# Patient Record
Sex: MALE | Race: WHITE | NOT HISPANIC OR LATINO
[De-identification: names, ages, dates, MRNs, and addresses within clinical notes are randomized per-mention and may not be internally consistent; named-entity substitution may affect disease eponyms.]

---
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
## 2024-05-17 NOTE — ED PROVIDER NOTE - IV ALTEPLASE INCLUSION HIDDEN
Problem: Adult Inpatient Plan of Care  Goal: Plan of Care Review  Outcome: Progressing  Goal: Patient-Specific Goal (Individualized)  Outcome: Progressing  Goal: Absence of Hospital-Acquired Illness or Injury  Outcome: Progressing  Goal: Optimal Comfort and Wellbeing  Outcome: Progressing  Goal: Readiness for Transition of Care  Outcome: Progressing      show

## 2024-05-24 ENCOUNTER — APPOINTMENT (OUTPATIENT)
Dept: ORTHOPEDIC SURGERY | Facility: CLINIC | Age: 19
End: 2024-05-24
Payer: COMMERCIAL

## 2024-05-24 VITALS — BODY MASS INDEX: 25.06 KG/M2 | HEIGHT: 72 IN | WEIGHT: 185 LBS

## 2024-05-24 DIAGNOSIS — S90.32XA CONTUSION OF LEFT FOOT, INITIAL ENCOUNTER: ICD-10-CM

## 2024-05-24 PROCEDURE — L4361: CPT | Mod: LT

## 2024-05-24 PROCEDURE — 73630 X-RAY EXAM OF FOOT: CPT | Mod: LT

## 2024-05-24 PROCEDURE — 99203 OFFICE O/P NEW LOW 30 MIN: CPT | Mod: 25

## 2024-05-24 NOTE — DISCUSSION/SUMMARY
[de-identified] : I recommend rest, ice, compression, elevation.  He was placed in an Ace bandage today and a short cam walker boot.  He was advised he can weight-bear as tolerated, but I recommended he use crutches as long as he is having discomfort while bearing weight.  He was also advised to continue with over-the-counter anti-inflammatories as needed for pain and swelling.  He will be leaving for [place] next week, and we discussed appropriate precautions while he is away on his trip.  He will follow-up in approximately 1 month's time for repeat assessment.  The patient was advised that if any issues arise prior to his appointment, that he should contact the office prior to his follow-up.  The patient was amenable to this plan.  All of his questions were answered to his satisfaction.

## 2024-05-24 NOTE — PHYSICAL EXAM
[de-identified] : On examination of his left foot, he is mildly edematous.  Not ecchymotic.  Skin is intact.  He has some tenderness noted along the base of the fifth metatarsal.  Mild tenderness as well as along the cuboid.  No other tenderness about the midfoot or remainder of the metatarsals.  He has no tenderness along the talus, or along the ankle.  He has full range of motion of the ankle without any limitations.  No pain along the heel.  No pain with calcaneal compression.  He can wiggle his toes.  Mild discomfort laterally with metatarsal compression.  Neurovascular intact distally.

## 2024-05-24 NOTE — DATA REVIEWED
[FreeTextEntry1] : 3 x-ray views were obtained today of the patient's left foot.  I see no obvious evidence of acute fracture or dislocation.

## 2024-05-24 NOTE — HISTORY OF PRESENT ILLNESS
[de-identified] : The patient presents today with his mother for evaluation of his left foot.  He states that 3 days ago, he was jumping into a pool, and landed on the lateral side of his foot.  Since then has had pain and swelling in it.  He has been able to bear weight with discomfort.  He denies any previous problems in the foot.  He took Advil once last night with minimal relief.

## 2024-06-27 ENCOUNTER — APPOINTMENT (OUTPATIENT)
Dept: ORTHOPEDIC SURGERY | Facility: CLINIC | Age: 19
End: 2024-06-27

## 2025-03-28 ENCOUNTER — APPOINTMENT (OUTPATIENT)
Dept: ORTHOPEDIC SURGERY | Facility: CLINIC | Age: 20
End: 2025-03-28
Payer: COMMERCIAL

## 2025-03-28 VITALS — WEIGHT: 195 LBS | HEIGHT: 72 IN | BODY MASS INDEX: 26.41 KG/M2

## 2025-03-28 DIAGNOSIS — S62.326A DISPLACED FRACTURE OF SHAFT OF FIFTH METACARPAL BONE, RIGHT HAND, INITIAL ENCOUNTER FOR CLOSED FRACTURE: ICD-10-CM

## 2025-03-28 DIAGNOSIS — S69.91XA UNSPECIFIED INJURY OF RIGHT WRIST, HAND AND FINGER(S), INITIAL ENCOUNTER: ICD-10-CM

## 2025-03-28 PROCEDURE — 99213 OFFICE O/P EST LOW 20 MIN: CPT | Mod: 25

## 2025-03-28 PROCEDURE — 73130 X-RAY EXAM OF HAND: CPT | Mod: RT

## 2025-04-17 ENCOUNTER — APPOINTMENT (OUTPATIENT)
Dept: ORTHOPEDIC SURGERY | Facility: CLINIC | Age: 20
End: 2025-04-17
Payer: COMMERCIAL

## 2025-04-17 DIAGNOSIS — S62.326A DISPLACED FRACTURE OF SHAFT OF FIFTH METACARPAL BONE, RIGHT HAND, INITIAL ENCOUNTER FOR CLOSED FRACTURE: ICD-10-CM

## 2025-04-17 PROCEDURE — 73130 X-RAY EXAM OF HAND: CPT | Mod: RT

## 2025-04-17 PROCEDURE — 99213 OFFICE O/P EST LOW 20 MIN: CPT

## 2025-04-29 ENCOUNTER — INPATIENT (INPATIENT)
Facility: HOSPITAL | Age: 20
LOS: 4 days | Discharge: HOME | DRG: 558 | End: 2025-05-04
Attending: STUDENT IN AN ORGANIZED HEALTH CARE EDUCATION/TRAINING PROGRAM | Admitting: FAMILY MEDICINE
Payer: COMMERCIAL

## 2025-04-29 VITALS
RESPIRATION RATE: 20 BRPM | HEART RATE: 99 BPM | TEMPERATURE: 98 F | SYSTOLIC BLOOD PRESSURE: 133 MMHG | HEIGHT: 72 IN | OXYGEN SATURATION: 97 % | WEIGHT: 199.96 LBS | DIASTOLIC BLOOD PRESSURE: 96 MMHG

## 2025-04-29 LAB
ALBUMIN SERPL ELPH-MCNC: 4.7 G/DL — SIGNIFICANT CHANGE UP (ref 3.5–5.2)
ALP SERPL-CCNC: 121 U/L — HIGH (ref 30–115)
ALT FLD-CCNC: 68 U/L — HIGH (ref 13–38)
ANION GAP SERPL CALC-SCNC: 12 MMOL/L — SIGNIFICANT CHANGE UP (ref 7–14)
APPEARANCE UR: CLEAR — SIGNIFICANT CHANGE UP
AST SERPL-CCNC: 240 U/L — HIGH (ref 13–38)
BASOPHILS # BLD AUTO: 0.05 K/UL — SIGNIFICANT CHANGE UP (ref 0–0.2)
BASOPHILS NFR BLD AUTO: 0.4 % — SIGNIFICANT CHANGE UP (ref 0–1)
BILIRUB SERPL-MCNC: 0.5 MG/DL — SIGNIFICANT CHANGE UP (ref 0.2–1.2)
BILIRUB UR-MCNC: NEGATIVE — SIGNIFICANT CHANGE UP
BUN SERPL-MCNC: 12 MG/DL — SIGNIFICANT CHANGE UP (ref 10–20)
CALCIUM SERPL-MCNC: 9.3 MG/DL — SIGNIFICANT CHANGE UP (ref 8.4–10.5)
CHLORIDE SERPL-SCNC: 104 MMOL/L — SIGNIFICANT CHANGE UP (ref 98–110)
CK SERPL-CCNC: HIGH U/L (ref 0–225)
CO2 SERPL-SCNC: 25 MMOL/L — SIGNIFICANT CHANGE UP (ref 17–32)
COLOR SPEC: YELLOW — SIGNIFICANT CHANGE UP
CREAT SERPL-MCNC: 0.9 MG/DL — SIGNIFICANT CHANGE UP (ref 0.7–1.5)
DIFF PNL FLD: NEGATIVE — SIGNIFICANT CHANGE UP
EGFR: 125 ML/MIN/1.73M2 — SIGNIFICANT CHANGE UP
EGFR: 125 ML/MIN/1.73M2 — SIGNIFICANT CHANGE UP
EOSINOPHIL # BLD AUTO: 0.07 K/UL — SIGNIFICANT CHANGE UP (ref 0–0.7)
EOSINOPHIL NFR BLD AUTO: 0.5 % — SIGNIFICANT CHANGE UP (ref 0–8)
GLUCOSE SERPL-MCNC: 100 MG/DL — HIGH (ref 70–99)
GLUCOSE UR QL: NEGATIVE MG/DL — SIGNIFICANT CHANGE UP
HCT VFR BLD CALC: 45.8 % — SIGNIFICANT CHANGE UP (ref 42–52)
HGB BLD-MCNC: 15.7 G/DL — SIGNIFICANT CHANGE UP (ref 14–18)
IMM GRANULOCYTES NFR BLD AUTO: 0.3 % — SIGNIFICANT CHANGE UP (ref 0.1–0.3)
KETONES UR-MCNC: NEGATIVE MG/DL — SIGNIFICANT CHANGE UP
LEUKOCYTE ESTERASE UR-ACNC: NEGATIVE — SIGNIFICANT CHANGE UP
LYMPHOCYTES # BLD AUTO: 1.86 K/UL — SIGNIFICANT CHANGE UP (ref 1.2–3.4)
LYMPHOCYTES # BLD AUTO: 14.2 % — LOW (ref 20.5–51.1)
MCHC RBC-ENTMCNC: 29.6 PG — SIGNIFICANT CHANGE UP (ref 27–31)
MCHC RBC-ENTMCNC: 34.3 G/DL — SIGNIFICANT CHANGE UP (ref 32–37)
MCV RBC AUTO: 86.3 FL — SIGNIFICANT CHANGE UP (ref 80–94)
MONOCYTES # BLD AUTO: 0.96 K/UL — HIGH (ref 0.1–0.6)
MONOCYTES NFR BLD AUTO: 7.3 % — SIGNIFICANT CHANGE UP (ref 1.7–9.3)
NEUTROPHILS # BLD AUTO: 10.12 K/UL — HIGH (ref 1.4–6.5)
NEUTROPHILS NFR BLD AUTO: 77.3 % — HIGH (ref 42.2–75.2)
NITRITE UR-MCNC: NEGATIVE — SIGNIFICANT CHANGE UP
NRBC BLD AUTO-RTO: 0 /100 WBCS — SIGNIFICANT CHANGE UP (ref 0–0)
PH UR: 6.5 — SIGNIFICANT CHANGE UP (ref 5–8)
PLATELET # BLD AUTO: 292 K/UL — SIGNIFICANT CHANGE UP (ref 130–400)
PMV BLD: 8.6 FL — SIGNIFICANT CHANGE UP (ref 7.4–10.4)
POTASSIUM SERPL-MCNC: 4 MMOL/L — SIGNIFICANT CHANGE UP (ref 3.5–5)
POTASSIUM SERPL-SCNC: 4 MMOL/L — SIGNIFICANT CHANGE UP (ref 3.5–5)
PROT SERPL-MCNC: 7.4 G/DL — SIGNIFICANT CHANGE UP (ref 6–8)
PROT UR-MCNC: NEGATIVE MG/DL — SIGNIFICANT CHANGE UP
RBC # BLD: 5.31 M/UL — SIGNIFICANT CHANGE UP (ref 4.7–6.1)
RBC # FLD: 13.3 % — SIGNIFICANT CHANGE UP (ref 11.5–14.5)
SODIUM SERPL-SCNC: 141 MMOL/L — SIGNIFICANT CHANGE UP (ref 135–146)
SP GR SPEC: 1.02 — SIGNIFICANT CHANGE UP (ref 1–1.03)
UROBILINOGEN FLD QL: 1 MG/DL — SIGNIFICANT CHANGE UP (ref 0.2–1)
WBC # BLD: 13.1 K/UL — HIGH (ref 4.8–10.8)
WBC # FLD AUTO: 13.1 K/UL — HIGH (ref 4.8–10.8)

## 2025-04-29 PROCEDURE — 82977 ASSAY OF GGT: CPT

## 2025-04-29 PROCEDURE — 82550 ASSAY OF CK (CPK): CPT

## 2025-04-29 PROCEDURE — 93970 EXTREMITY STUDY: CPT

## 2025-04-29 PROCEDURE — 80048 BASIC METABOLIC PNL TOTAL CA: CPT

## 2025-04-29 PROCEDURE — 85027 COMPLETE CBC AUTOMATED: CPT

## 2025-04-29 PROCEDURE — 36415 COLL VENOUS BLD VENIPUNCTURE: CPT

## 2025-04-29 PROCEDURE — 80053 COMPREHEN METABOLIC PANEL: CPT

## 2025-04-29 PROCEDURE — 99222 1ST HOSP IP/OBS MODERATE 55: CPT

## 2025-04-29 PROCEDURE — 99285 EMERGENCY DEPT VISIT HI MDM: CPT

## 2025-04-29 RX ORDER — KETOROLAC TROMETHAMINE 30 MG/ML
15 INJECTION, SOLUTION INTRAMUSCULAR; INTRAVENOUS ONCE
Refills: 0 | Status: DISCONTINUED | OUTPATIENT
Start: 2025-04-29 | End: 2025-04-29

## 2025-04-29 RX ADMIN — Medication 1000 MILLILITER(S): at 22:42

## 2025-04-29 RX ADMIN — Medication 1000 MILLILITER(S): at 23:12

## 2025-04-29 NOTE — ED PROVIDER NOTE - OBJECTIVE STATEMENT
18 y/o male presents to the with b/l upper extremity pain worse with movement 2 days after vigorous weight lifting at gym. patient with decreased ability to extend b/l elbows secondary to pain. no relief with ibuprofen. no distal tingling. patient denies any rash, abdominal pain or vomiting. patient with good urinary output however describes as dark. no fevers.

## 2025-04-29 NOTE — ED PROVIDER NOTE - CARE PLAN
1 Principal Discharge DX:	Rhabdomyolysis   Principal Discharge DX:	Rhabdomyolysis  Secondary Diagnosis:	Elevated liver function tests

## 2025-04-29 NOTE — ED PROVIDER NOTE - PHYSICAL EXAMINATION
general: well appearing, no distress  eyes: clear conjunctiva  ent: oropharynx clear   cardiac: no murmurs, extrasystole, regular rate, regular rhythm  resp: clear throughout all lung fields, no respiratory distress  abdomen: no CVA tenderness, BS x 4, non tender, no distention, no rashes, no rebound or guarding  msk: +ttp along volar aspect of forearm and distal humerus with decreased extension at elbows L>R  skin: no rashes, swelling, bruising general: well appearing, no distress  eyes: clear conjunctiva  ent: oropharynx clear   cardiac: no murmurs, extrasystole, regular rate, regular rhythm  resp: clear throughout all lung fields, no respiratory distress  abdomen: no CVA tenderness, BS x 4, non tender, no distention, no rashes, no rebound or guarding  msk: +ttp along volar aspect of forearm and distal humerus with decreased extension at elbows L>R  skin: no rashes, swelling, bruising, compartments soft

## 2025-04-29 NOTE — ED PROVIDER NOTE - ATTENDING APP SHARED VISIT CONTRIBUTION OF CARE
19-year-old male no significant past medical history presents with his mom for evaluation of bilateral arm pain left more than right for the last 2 days.  Patient recently returned to the gym and did bicep curls with heavy weights.  Patient states he is having trouble specially with the left arm.  No direct trauma, no shortness of breath, no difficulty urinating, patient states that it is dark at times.  On exam patient in NAD, AAO x 3, OP clear, MMM, abdomen soft nontender, patient with tenderness to left forearm with painful range of motion, no swelling, distal pulses, no skin changes

## 2025-04-29 NOTE — ED PROVIDER NOTE - CLINICAL SUMMARY MEDICAL DECISION MAKING FREE TEXT BOX
IV fluids were initiated and pain was treated.  We obtained labs.  Patient has elevated CK level.  Renal function is normal.  Patient does have elevation in LFTs.  Results discussed with him and his mom.  Patient will require admission for these findings at this time.

## 2025-04-30 LAB
ALBUMIN SERPL ELPH-MCNC: 3.9 G/DL — SIGNIFICANT CHANGE UP (ref 3.5–5.2)
ALP SERPL-CCNC: 99 U/L — SIGNIFICANT CHANGE UP (ref 30–115)
ALT FLD-CCNC: 68 U/L — HIGH (ref 13–38)
ANION GAP SERPL CALC-SCNC: 15 MMOL/L — HIGH (ref 7–14)
AST SERPL-CCNC: 245 U/L — HIGH (ref 13–38)
BILIRUB SERPL-MCNC: 0.9 MG/DL — SIGNIFICANT CHANGE UP (ref 0.2–1.2)
BUN SERPL-MCNC: 7 MG/DL — LOW (ref 10–20)
CALCIUM SERPL-MCNC: 8.7 MG/DL — SIGNIFICANT CHANGE UP (ref 8.4–10.5)
CHLORIDE SERPL-SCNC: 105 MMOL/L — SIGNIFICANT CHANGE UP (ref 98–110)
CK SERPL-CCNC: HIGH U/L (ref 0–225)
CK SERPL-CCNC: HIGH U/L (ref 0–225)
CO2 SERPL-SCNC: 21 MMOL/L — SIGNIFICANT CHANGE UP (ref 17–32)
CREAT SERPL-MCNC: 0.8 MG/DL — SIGNIFICANT CHANGE UP (ref 0.7–1.5)
EGFR: 130 ML/MIN/1.73M2 — SIGNIFICANT CHANGE UP
EGFR: 130 ML/MIN/1.73M2 — SIGNIFICANT CHANGE UP
GGT SERPL-CCNC: 6 U/L — SIGNIFICANT CHANGE UP (ref 1–40)
GLUCOSE SERPL-MCNC: 77 MG/DL — SIGNIFICANT CHANGE UP (ref 70–99)
HCT VFR BLD CALC: 41.5 % — LOW (ref 42–52)
HGB BLD-MCNC: 13.8 G/DL — LOW (ref 14–18)
MCHC RBC-ENTMCNC: 29.2 PG — SIGNIFICANT CHANGE UP (ref 27–31)
MCHC RBC-ENTMCNC: 33.3 G/DL — SIGNIFICANT CHANGE UP (ref 32–37)
MCV RBC AUTO: 87.7 FL — SIGNIFICANT CHANGE UP (ref 80–94)
NRBC BLD AUTO-RTO: 0 /100 WBCS — SIGNIFICANT CHANGE UP (ref 0–0)
PLATELET # BLD AUTO: 256 K/UL — SIGNIFICANT CHANGE UP (ref 130–400)
PMV BLD: 9.2 FL — SIGNIFICANT CHANGE UP (ref 7.4–10.4)
POTASSIUM SERPL-MCNC: 4.1 MMOL/L — SIGNIFICANT CHANGE UP (ref 3.5–5)
POTASSIUM SERPL-SCNC: 4.1 MMOL/L — SIGNIFICANT CHANGE UP (ref 3.5–5)
PROT SERPL-MCNC: 6.3 G/DL — SIGNIFICANT CHANGE UP (ref 6–8)
RBC # BLD: 4.73 M/UL — SIGNIFICANT CHANGE UP (ref 4.7–6.1)
RBC # FLD: 13.2 % — SIGNIFICANT CHANGE UP (ref 11.5–14.5)
SODIUM SERPL-SCNC: 141 MMOL/L — SIGNIFICANT CHANGE UP (ref 135–146)
WBC # BLD: 10.29 K/UL — SIGNIFICANT CHANGE UP (ref 4.8–10.8)
WBC # FLD AUTO: 10.29 K/UL — SIGNIFICANT CHANGE UP (ref 4.8–10.8)

## 2025-04-30 PROCEDURE — 93970 EXTREMITY STUDY: CPT | Mod: 26

## 2025-04-30 PROCEDURE — 99232 SBSQ HOSP IP/OBS MODERATE 35: CPT

## 2025-04-30 RX ORDER — SENNA 187 MG
2 TABLET ORAL AT BEDTIME
Refills: 0 | Status: DISCONTINUED | OUTPATIENT
Start: 2025-04-30 | End: 2025-05-04

## 2025-04-30 RX ORDER — ONDANSETRON HCL/PF 4 MG/2 ML
4 VIAL (ML) INJECTION EVERY 6 HOURS
Refills: 0 | Status: DISCONTINUED | OUTPATIENT
Start: 2025-04-30 | End: 2025-05-04

## 2025-04-30 RX ORDER — ACETAMINOPHEN 500 MG/5ML
650 LIQUID (ML) ORAL EVERY 6 HOURS
Refills: 0 | Status: DISCONTINUED | OUTPATIENT
Start: 2025-04-30 | End: 2025-05-04

## 2025-04-30 RX ORDER — SODIUM CHLORIDE 9 G/1000ML
1000 INJECTION, SOLUTION INTRAVENOUS
Refills: 0 | Status: DISCONTINUED | OUTPATIENT
Start: 2025-04-30 | End: 2025-05-04

## 2025-04-30 RX ADMIN — Medication 150 MILLILITER(S): at 01:10

## 2025-04-30 RX ADMIN — SODIUM CHLORIDE 150 MILLILITER(S): 9 INJECTION, SOLUTION INTRAVENOUS at 14:36

## 2025-04-30 RX ADMIN — Medication 40 MILLIGRAM(S): at 06:03

## 2025-04-30 NOTE — H&P ADULT - ASSESSMENT
Patient is 20 yo male with no previous PMHX presenting with       #Rabdo  #Abnormal LFT  -doppler us of upper extremities to r/o dvt  -IVF  -Monitor CPK level  -Nephrology consult   -Monitor for any sign of compartment syndrome  -Obtain GGT, if GGT is elevated consider hepatitis panel, US of abdm, and GI consult     #Progress Note Handoff  Pending (specify):  as above   Family discussion:  plan of care was discussed with patient and family in details.  all questions were answered.  seems to understand, and in agreement  Disposition:  home

## 2025-04-30 NOTE — PATIENT PROFILE ADULT - REASON FOR REFUSAL (REFER PATIENT TO HEALTHCARE PROVIDER FOR FOLLOW-UP):
Estrogens Drug  Instruction  Comment & Exceptions    Oral contraceptives  For non-orthopedic surgery:   If Modified Caprini score <5, continue without modification.   Discontinue 2 weeks before surgery if high risk of VTE (Modified Caprini Score 5 or greater - see table below).  patient on non-hormonal birth control options to use in the interim.    Postmenopausal and gender affirming hormone therapy    Estrogen receptor modulators   (Tamoxifen)          Modified Caprini risk assessment model for VTE in general surgical patients* (Reproduced from Joanna et al., 2012) Risk score    1 point  2 points  3 points  4 points    Age 41 to 60 years  Age 61-74 years  Age ?75 years  Stroke (<1 month ago)    Minor surgery  Arthroscopic surgery  History of VTE  Elective arthroplasty    BMI >25 kg/m2  Major open surgery (>45 minutes)  Family history of VTE  Hip, pelvis, or leg fracture    Swollen legs  Laparoscopic surgery (>45 minutes)  Factor V Leidin  Acute spinal cord injury (<1 month ago)    Vericose veins  Malignancy  Prothrombin [id number]    Pregnancy or postpartum  Confined to bed >72 hours  Lupus anticoagulant    History of unexplained recurrent spontaneous   Immobilizing plaster cast  Anticardiolipin antibodies    Oral contraceptives or hormone replacement  Central venous access  Elevated serum homocysteine    Sepsis (<1 month ago)  Heparin-induced thrombocytopenia    Serious lung disease, including pneumonia (<1 month ago)  Other congenital or acquired thrombophilia    Abnormal pulmonary function    Acute myocardial infarction    Congestive heart failure    History of inflammatory bowel disease    Medical patient on bed rest    Interpretation    Surgical Risk Category  Score  Estimated VTE risk in absence of pharmacologic or mechanical prophylaxis    Very low  0  <0.5%    Low  1 to 2  1.5%    Moderate  3 to 4  3.0%    High  ?5  6.0%        Preparing for Your Surgery  Getting started  A surgery nurse will  "call you to review your health history and instructions. They will give you an arrival time based on your scheduled surgery time.  Please be ready to share the following:    Your doctor's clinic name and phone number    Your medical, surgical and anesthesia history    A list of allergies and sensitivities    A list of medicines, including herbal treatments and over-the-counter drugs    Whether the patient has a legal guardian (ask how to send us the papers in advance)  If your child is having surgery, please ask for a copy of Preparing for Your Child's Surgery.    Preparing for surgery    Within 30 days of surgery: Have an exam at your family clinic (primary care clinic), or go to a pre-operative clinic. This exam is called a \"History and Physical,\" or H&P.    At your H&P exam, talk to your care team about all medicines you take. If you need to stop any medicines before surgery, ask when to start taking them again.  ? We do this for your safety. Many medicines can make you bleed too much during surgery. Some change how well surgery (anesthesia) drugs work.    Call your insurance company to see what it will and won't pay for. Ask if they need to pre-approve the surgery. (If no insurance, call [phone].)    Call your surgeon's clinic if there's any change in your health. This includes signs of a cold or flu (sore throat, runny nose, cough, rash, fever). It also includes a scrape or scratch near the surgery site.    If you have questions on the day of surgery, call your surgery center.  Eating and drinking guidelines  For your safety: Unless your surgeon tells you otherwise, follow the guidelines below.    Eat and drink as usual until 8 hours before surgery. After that, no food or milk.    Drink clear liquids until 2 hours before surgery. These are liquids you can see through, like water, Gatorade and Propel Water. You may also have black coffee and tea (no cream or milk).    Nothing by mouth within 2 hours of " surgery. This includes gum, candy and breath mints.    Stop alcohol the midnight before surgery.    If your family clinic tells you to take medicine on the morning of surgery, it's okay to take it with a sip of water.  Preventing infection    Shower or bathe the night before and morning of your surgery. Follow the instructions your clinic gave you. (If no instructions, use regular soap.)    Don't shave or clip hair near your surgery site. This can lead to skin infection.    Don't smoke the morning of surgery. Smoking increases the risk of infection. You may chew nicotine gum up to 2 hours before surgery. A nicotine patch is okay.  ? Note: Some surgeries require you to completely quit smoking and nicotine. Check with your surgeon.    Your care team will make every effort to keep you safe from infection. We will:  ? Clean our hands often with soap and water (or an alcohol-based hand rub).  ? Clean the skin at your surgery site with a special soap that kills germs. We'll also remove hair from the site as needed.  ? Wear special hair covers, masks, gowns and gloves during surgery.  ? Give antibiotic medicine, if prescribed. Not all surgeries need antibiotics.  What to bring on the day of surgery    Photo ID and insurance card    Copy of your health care directive, if you have one    Glasses and hearing aides (bring cases)  ? You can't wear contacts during surgery    Inhaler and eye drops, if you use them (tell us about these when you arrive)    CPAP machine or breathing device, if you use them    A few personal items, if spending the night    If you have . . .  ? A pacemaker or ICD (cardiac defibrillator): Bring the ID card.  ? An implanted stimulator: Bring the remote control.  ? A legal guardian: Bring a copy of the certified (court-stamped) guardianship papers.  Please remove any jewelry, including body piercings. Leave jewelry and other valuables at home.  If you're going home the day of surgery  Important: If you  don't follow the rules below, we must cancel your surgery.     Arrange for someone to drive you home after surgery. You may not drive, take a taxi or take public transportation by yourself (unless you'll have local anesthesia only).    Arrange for a responsible adult to stay with you overnight. If you don't, we may keep you in the hospital overnight, and you may need to pay the costs yourself.  Questions?   If you have any questions for your care team, list them here: _________________________________________________________________________________________________________________________________________________________________________________________________________________________________________________________________________________________________________________________  For informational purposes only. Not to replace the advice of your health care provider. Copyright   9116-[address] [place] [facility]. All rights reserved. Clinically reviewed by Cecilia Hull MD. [employer] 722353 - REV 07/19.      How to Take Your Medication Before Surgery  - STOP taking all vitamins and herbal supplements 14 days before surgery.   Pt does not want vaccine

## 2025-04-30 NOTE — H&P ADULT - NSHPPHYSICALEXAM_GEN_ALL_CORE
Vital Signs Last 24 Hrs  T(C): 36.8 (30 Apr 2025 00:46), Max: 36.8 (30 Apr 2025 00:46)  T(F): 98.2 (30 Apr 2025 00:46), Max: 98.2 (30 Apr 2025 00:46)  HR: 84 (30 Apr 2025 00:46) (79 - 99)  BP: 130/89 (30 Apr 2025 00:46) (128/84 - 133/96)  BP(mean): --  RR: 18 (30 Apr 2025 00:46) (17 - 20)  SpO2: 100% (30 Apr 2025 00:46) (97% - 100%)    Parameters below as of 30 Apr 2025 00:46  Patient On (Oxygen Delivery Method): room air      PHYSICAL EXAM-  GENERAL: NAD,    HEAD:  Atraumatic, Normocephalic  EYES: EOMI, PERRLA, conjunctiva and sclera clear  NECK: Supple, No JVD, Normal thyroid  NERVOUS SYSTEM:  Alert & Oriented X3, Motor Strength 5/5 B/L upper and lower extremities; DTRs 2+ intact and symmetric  CHEST/LUNG: Clear to percussion bilaterally; No rales, rhonchi, wheezing, or rubs  HEART: Regular rate and rhythm; No murmurs, rubs, or gallops  ABDOMEN: Soft, Nontender, Nondistended; Bowel sounds present  EXTREMITIES:  2+ Peripheral Pulses, No clubbing, cyanosis, or edema.  B/L arm swelling around the bicep muscle with tenderness, will ROM of joints intact.  compartment is soft  SKIN: No rashes or lesions

## 2025-04-30 NOTE — PATIENT PROFILE ADULT - FALL HARM RISK - UNIVERSAL INTERVENTIONS
Bed in lowest position, wheels locked, appropriate side rails in place/Call bell, personal items and telephone in reach/Instruct patient to call for assistance before getting out of bed or chair/Non-slip footwear when patient is out of bed/[place] to call system/Physically safe environment - no spills, clutter or unnecessary equipment/Purposeful Proactive Rounding/Room/bathroom lighting operational, light cord in reach

## 2025-04-30 NOTE — H&P ADULT - HISTORY OF PRESENT ILLNESS
20 y/o male presents to the with b/l upper extremity pain worse with movement 2 days after vigorous weight lifting at gym. patient with decreased ability to extend b/l elbows secondary to pain. no relief with ibuprofen. no distal tingling. patient denies any rash, abdominal pain or vomiting. patient with good urinary output however describes as dark. no fevers.

## 2025-04-30 NOTE — ED ADULT NURSE NOTE - NSFALLCONCLUSION_ED_ALL_ED
Requested medication(s) are due for refill today: Yes  Patient has already received a courtesy refill: No  Other reason request has been forwarded to provider: Medication Refill [employer]

## 2025-04-30 NOTE — CONSULT NOTE ADULT - SUBJECTIVE AND OBJECTIVE BOX
NEPHROLOGY CONSULTATION NOTE    BIJU LEDEZMA  19y  Male  MRN-[id number]    CC:   Patient is a 19y old  Male who presents with a chief complaint of arms pain (30 Apr 2025 00:53)      HPI:  18 y/o male presents to the with b/l upper extremity pain worse with movement 2 days after vigorous weight lifting at gym. patient with decreased ability to extend b/l elbows secondary to pain. no relief with ibuprofen. no distal tingling. patient denies any rash, abdominal pain or vomiting. patient with good urinary output however describes as dark. no fevers. (30 Apr 2025 00:53)      PAST MEDICAL & SURGICAL HISTORY:  No pertinent past medical history      No significant past surgical history        Allergies:  penicillins (Anaphylaxis; Hives)    Home Medications Reviewed  Hospital Medications:   MEDICATIONS  (STANDING):  chlorhexidine 2% Cloths 1 Application(s) Topical <User Schedule>  lactated ringers. 1000 milliLiter(s) (150 mL/Hr) IV Continuous <Continuous>    MEDICATIONS  (PRN):  acetaminophen     Tablet .. 650 milliGRAM(s) Oral every 6 hours PRN Temp greater or equal to 38C (100.4F), Mild Pain (1 - 3)  ondansetron Injectable 4 milliGRAM(s) IV Push every 6 hours PRN Nausea  senna 2 Tablet(s) Oral at bedtime PRN Constipation    Home Medications:      SOCIAL HISTORY:  Social History:      FAMILY HISTORY:      REVIEW OF SYSTEMS:  All other review of systems is negative unless indicated above.    VITALS:  T(F): 98.6 (04-30-25 @ 04:29), Max: 98.6 (04-30-25 @ 04:29)  HR: 78 (04-30-25 @ 04:29)  BP: 125/77 (04-30-25 @ 04:29)  RR: 18 (04-30-25 @ 04:29)  SpO2: 97% (04-30-25 @ 04:29)    Height (cm): 182.9 (04-30 @ 00:46)  Weight (kg): 90.718 (04-30 @ 00:46)  BMI (kg/m2): 27.1 (04-30 @ 00:46)  BSA (m2): 2.13 (04-30 @ 00:46)  I&O's Detail        I&O's Summary      PHYSICAL EXAM:  Gen: NAD  resp: b/l breath sounds  card: S1/S2  abd: soft  ext: no edema  vascular access:     LABS:  Daily Height in cm: 182.88 (30 Apr 2025 00:46)    Daily       04-30    141  |  105  |  7[L]  ----------------------------<  77  4.1   |  21  |  0.8    Ca    8.7      30 Apr 2025 07:19    TPro  6.3  /  Alb  3.9  /  TBili  0.9  /  DBili      /  AST  245[H]  /  ALT  68[H]  /  AlkPhos  99  04-30      Creatinine Trend:   Creatinine: 0.8 mg/dL (04-30-25 @ 07:19)  Creatinine: 0.9 mg/dL (04-29-25 @ 22:24)    Creatine Kinase: 33884 U/L (04.30.25 @ 07:19)  Creatine Kinase: 54843: Result obtained via manual dilution. U/L (04.29.25 @ 22:24)                                  13.8   10.29 )-----------( 256      ( 30 Apr 2025 07:19 )             41.5     Mean Cell Volume: 87.7 fL (04-30-25 @ 07:19)    Urine Studies:  Protein, Urine: Negative mg/dL (04-29-25 @ 23:48)              RADIOLOGY & ADDITIONAL STUDIES:

## 2025-04-30 NOTE — CONSULT NOTE ADULT - ASSESSMENT
Rhabdomyolysis / no renal impairment    - cont aggressive iv hydration  - monitor volume status  - trend CPK

## 2025-05-01 ENCOUNTER — TRANSCRIPTION ENCOUNTER (OUTPATIENT)
Age: 20
End: 2025-05-01

## 2025-05-01 LAB
ANION GAP SERPL CALC-SCNC: 11 MMOL/L — SIGNIFICANT CHANGE UP (ref 7–14)
BUN SERPL-MCNC: 5 MG/DL — LOW (ref 10–20)
CALCIUM SERPL-MCNC: 9.4 MG/DL — SIGNIFICANT CHANGE UP (ref 8.4–10.5)
CHLORIDE SERPL-SCNC: 104 MMOL/L — SIGNIFICANT CHANGE UP (ref 98–110)
CK SERPL-CCNC: HIGH U/L (ref 0–225)
CO2 SERPL-SCNC: 25 MMOL/L — SIGNIFICANT CHANGE UP (ref 17–32)
CREAT SERPL-MCNC: 0.7 MG/DL — SIGNIFICANT CHANGE UP (ref 0.7–1.5)
EGFR: 135 ML/MIN/1.73M2 — SIGNIFICANT CHANGE UP
EGFR: 135 ML/MIN/1.73M2 — SIGNIFICANT CHANGE UP
GLUCOSE SERPL-MCNC: 114 MG/DL — HIGH (ref 70–99)
POTASSIUM SERPL-MCNC: 4.1 MMOL/L — SIGNIFICANT CHANGE UP (ref 3.5–5)
POTASSIUM SERPL-SCNC: 4.1 MMOL/L — SIGNIFICANT CHANGE UP (ref 3.5–5)
SODIUM SERPL-SCNC: 140 MMOL/L — SIGNIFICANT CHANGE UP (ref 135–146)

## 2025-05-01 PROCEDURE — 99232 SBSQ HOSP IP/OBS MODERATE 35: CPT

## 2025-05-01 RX ADMIN — Medication 650 MILLIGRAM(S): at 10:20

## 2025-05-01 RX ADMIN — Medication 650 MILLIGRAM(S): at 17:31

## 2025-05-01 RX ADMIN — Medication 650 MILLIGRAM(S): at 09:36

## 2025-05-02 LAB
ALBUMIN SERPL ELPH-MCNC: 4 G/DL — SIGNIFICANT CHANGE UP (ref 3.5–5.2)
ALP SERPL-CCNC: 97 U/L — SIGNIFICANT CHANGE UP (ref 30–115)
ALT FLD-CCNC: 103 U/L — HIGH (ref 13–38)
ANION GAP SERPL CALC-SCNC: 9 MMOL/L — SIGNIFICANT CHANGE UP (ref 7–14)
AST SERPL-CCNC: 254 U/L — HIGH (ref 13–38)
BILIRUB SERPL-MCNC: 0.7 MG/DL — SIGNIFICANT CHANGE UP (ref 0.2–1.2)
BUN SERPL-MCNC: 4 MG/DL — LOW (ref 10–20)
CALCIUM SERPL-MCNC: 9.3 MG/DL — SIGNIFICANT CHANGE UP (ref 8.4–10.5)
CHLORIDE SERPL-SCNC: 102 MMOL/L — SIGNIFICANT CHANGE UP (ref 98–110)
CK SERPL-CCNC: HIGH U/L (ref 0–225)
CO2 SERPL-SCNC: 27 MMOL/L — SIGNIFICANT CHANGE UP (ref 17–32)
CREAT SERPL-MCNC: 0.8 MG/DL — SIGNIFICANT CHANGE UP (ref 0.7–1.5)
EGFR: 130 ML/MIN/1.73M2 — SIGNIFICANT CHANGE UP
EGFR: 130 ML/MIN/1.73M2 — SIGNIFICANT CHANGE UP
GLUCOSE SERPL-MCNC: 91 MG/DL — SIGNIFICANT CHANGE UP (ref 70–99)
HCT VFR BLD CALC: 45 % — SIGNIFICANT CHANGE UP (ref 42–52)
HGB BLD-MCNC: 15 G/DL — SIGNIFICANT CHANGE UP (ref 14–18)
MCHC RBC-ENTMCNC: 28.8 PG — SIGNIFICANT CHANGE UP (ref 27–31)
MCHC RBC-ENTMCNC: 33.3 G/DL — SIGNIFICANT CHANGE UP (ref 32–37)
MCV RBC AUTO: 86.5 FL — SIGNIFICANT CHANGE UP (ref 80–94)
NRBC BLD AUTO-RTO: 0 /100 WBCS — SIGNIFICANT CHANGE UP (ref 0–0)
PLATELET # BLD AUTO: 245 K/UL — SIGNIFICANT CHANGE UP (ref 130–400)
PMV BLD: 8.8 FL — SIGNIFICANT CHANGE UP (ref 7.4–10.4)
POTASSIUM SERPL-MCNC: 4.4 MMOL/L — SIGNIFICANT CHANGE UP (ref 3.5–5)
POTASSIUM SERPL-SCNC: 4.4 MMOL/L — SIGNIFICANT CHANGE UP (ref 3.5–5)
PROT SERPL-MCNC: 6.6 G/DL — SIGNIFICANT CHANGE UP (ref 6–8)
RBC # BLD: 5.2 M/UL — SIGNIFICANT CHANGE UP (ref 4.7–6.1)
RBC # FLD: 13 % — SIGNIFICANT CHANGE UP (ref 11.5–14.5)
SODIUM SERPL-SCNC: 138 MMOL/L — SIGNIFICANT CHANGE UP (ref 135–146)
WBC # BLD: 8.46 K/UL — SIGNIFICANT CHANGE UP (ref 4.8–10.8)
WBC # FLD AUTO: 8.46 K/UL — SIGNIFICANT CHANGE UP (ref 4.8–10.8)

## 2025-05-02 PROCEDURE — 99232 SBSQ HOSP IP/OBS MODERATE 35: CPT

## 2025-05-02 RX ADMIN — SODIUM CHLORIDE 100 MILLILITER(S): 9 INJECTION, SOLUTION INTRAVENOUS at 09:27

## 2025-05-03 LAB
ANION GAP SERPL CALC-SCNC: 11 MMOL/L — SIGNIFICANT CHANGE UP (ref 7–14)
BUN SERPL-MCNC: 6 MG/DL — LOW (ref 10–20)
CALCIUM SERPL-MCNC: 9.8 MG/DL — SIGNIFICANT CHANGE UP (ref 8.4–10.5)
CHLORIDE SERPL-SCNC: 101 MMOL/L — SIGNIFICANT CHANGE UP (ref 98–110)
CK SERPL-CCNC: HIGH U/L (ref 0–225)
CO2 SERPL-SCNC: 28 MMOL/L — SIGNIFICANT CHANGE UP (ref 17–32)
CREAT SERPL-MCNC: 0.8 MG/DL — SIGNIFICANT CHANGE UP (ref 0.7–1.5)
EGFR: 130 ML/MIN/1.73M2 — SIGNIFICANT CHANGE UP
EGFR: 130 ML/MIN/1.73M2 — SIGNIFICANT CHANGE UP
GLUCOSE SERPL-MCNC: 87 MG/DL — SIGNIFICANT CHANGE UP (ref 70–99)
POTASSIUM SERPL-MCNC: 4.6 MMOL/L — SIGNIFICANT CHANGE UP (ref 3.5–5)
POTASSIUM SERPL-SCNC: 4.6 MMOL/L — SIGNIFICANT CHANGE UP (ref 3.5–5)
SODIUM SERPL-SCNC: 140 MMOL/L — SIGNIFICANT CHANGE UP (ref 135–146)

## 2025-05-03 PROCEDURE — 99232 SBSQ HOSP IP/OBS MODERATE 35: CPT

## 2025-05-03 RX ADMIN — SODIUM CHLORIDE 100 MILLILITER(S): 9 INJECTION, SOLUTION INTRAVENOUS at 04:58

## 2025-05-03 RX ADMIN — Medication 1 APPLICATION(S): at 05:03

## 2025-05-03 NOTE — PROGRESS NOTE ADULT - SUBJECTIVE AND OBJECTIVE BOX
CHIEF COMPLAINT:    Patient is a 19y old  Male who presents with a chief complaint of arms pain (02 May 2025 16:40)    INTERVAL HPI/OVERNIGHT EVENTS:    Patient seen and examined at bedside. No acute overnight events occurred. Has felt fine, no complaints, [place] 525[address]    ROS: All other systems are negative.    Medications:  Standing  chlorhexidine 2% Cloths 1 Application(s) Topical <User Schedule>  lactated ringers. 1000 milliLiter(s) IV Continuous <Continuous>    PRN Meds  acetaminophen     Tablet .. 650 milliGRAM(s) Oral every 6 hours PRN  ondansetron Injectable 4 milliGRAM(s) IV Push every 6 hours PRN  senna 2 Tablet(s) Oral at bedtime PRN        Vital Signs:    T(F): 98 (05-03-25 @ 05:01), Max: 98.5 (05-02-25 @ 21:26)  HR: 73 (05-03-25 @ 05:01) (73 - 88)  BP: 147/93 (05-03-25 @ 05:01) (134/87 - 147/93)  RR: 16 (05-03-25 @ 05:01) (16 - 16)  SpO2: 98% (05-03-25 @ 05:01) (97% - 98%)  I&O's Summary    Daily     Daily   CAPILLARY BLOOD GLUCOSE    PHYSICAL EXAM:  GENERAL:  NAD  SKIN: No rashes or lesions  HEENT: Atraumatic. Normocephalic. Anicteric  NECK:  No JVD.   PULMONARY: Clear to ausculation bilaterally. No wheezing. No rales  CVS: Normal S1, S2. Regular rate and rhythm. No murmurs.  ABDOMEN/GI: Soft, Nontender, Nondistended; Bowel sounds are present  EXTREMITIES:  No edema B/L LE    LABS:                        15.0   8.46  )-----------( 245      ( 02 May 2025 07:01 )             45.0     05-03    140  |  101  |  6[L]  ----------------------------<  87  4.6   |  28  |  0.8    Ca    9.8      03 May 2025 09:25    TPro  6.6  /  Alb  4.0  /  TBili  0.7  /  DBili  x   /  AST  254[H]  /  ALT  103[H]  /  AlkPhos  97  05-02    RADIOLOGY & ADDITIONAL TESTS:  Imaging or report Personally Reviewed:  [ ] YES  [ ] NO -->no new images          
CHIEF COMPLAINT:    Patient is a 19y old  Male who presents with a chief complaint of arms pain (02 May 2025 16:31)      INTERVAL HPI/OVERNIGHT EVENTS:    Patient seen and examined at bedside with mom present. Pt has been feeling fine.     Medications:  Standing  chlorhexidine 2% Cloths 1 Application(s) Topical <User Schedule>  lactated ringers. 1000 milliLiter(s) IV Continuous <Continuous>    PRN Meds  acetaminophen     Tablet .. 650 milliGRAM(s) Oral every 6 hours PRN  ondansetron Injectable 4 milliGRAM(s) IV Push every 6 hours PRN  senna 2 Tablet(s) Oral at bedtime PRN        Vital Signs:    T(F): 97.9 (05-02-25 @ 14:06), Max: 98.3 (05-01-25 @ 20:33)  HR: 79 (05-02-25 @ 14:06) (62 - 79)  BP: 142/91 (05-02-25 @ 14:06) (126/79 - 149/98)  RR: 16 (05-02-25 @ 14:06) (16 - 18)  SpO2: 97% (05-02-25 @ 14:06) (96% - 99%)  I&O's Summary    Daily     Daily   CAPILLARY BLOOD GLUCOSE    PHYSICAL EXAM:  GENERAL:  NAD  SKIN: No rashes or lesions  HEENT: Atraumatic. Normocephalic. Anicteric  NECK:  No JVD.   PULMONARY: Clear to ausculation bilaterally. No wheezing. No rales  CVS: Normal S1, S2. Regular rate and rhythm. No murmurs.  ABDOMEN/GI: Soft, Nontender, Nondistended; Bowel sounds are present  EXTREMITIES:  No edema B/L LE    LABS:                        15.0   8.46  )-----------( 245      ( 02 May 2025 07:01 )             45.0     05-02    138  |  102  |  4[L]  ----------------------------<  91  4.4   |  27  |  0.8    Ca    9.3      02 May 2025 07:01    TPro  6.6  /  Alb  4.0  /  TBili  0.7  /  DBili  x   /  AST  254[H]  /  ALT  103[H]  /  AlkPhos  97  05-02    Urinalysis with Rflx Culture (collected 29 Apr 2025 23:48)        RADIOLOGY & ADDITIONAL TESTS:  Imaging or report Personally Reviewed:  [ ] YES  [ ] NO -->no new images            
CHIEF COMPLAINT:    Patient is a 19y old  Male who presents with a chief complaint of arms pain (30 Apr 2025 12:50)      INTERVAL HPI/OVERNIGHT EVENTS:    Patient seen and examined at bedside with mom present. Pt still has arm pain, no other complaints.    Medications:  Standing  chlorhexidine 2% Cloths 1 Application(s) Topical <User Schedule>  lactated ringers. 1000 milliLiter(s) IV Continuous <Continuous>    PRN Meds  acetaminophen     Tablet .. 650 milliGRAM(s) Oral every 6 hours PRN  ondansetron Injectable 4 milliGRAM(s) IV Push every 6 hours PRN  senna 2 Tablet(s) Oral at bedtime PRN    Vital Signs:    T(F): 98.4 (04-30-25 @ 14:18), Max: 98.6 (04-30-25 @ 04:29)  HR: 89 (04-30-25 @ 14:18) (78 - 99)  BP: 130/88 (04-30-25 @ 14:18) (125/77 - 133/96)  RR: 18 (04-30-25 @ 14:18) (17 - 20)  SpO2: 98% (04-30-25 @ 14:18) (97% - 100%)  I&O's Summary    Daily Height in cm: 182.88 (30 Apr 2025 00:46)    Daily   CAPILLARY BLOOD GLUCOSE    PHYSICAL EXAM:  GENERAL:  NAD  SKIN: No rashes or lesions  HEENT: Atraumatic. Normocephalic. Anicteric  NECK:  No JVD.   PULMONARY: Clear to ausculation bilaterally. No wheezing. No rales  CVS: Normal S1, S2. Regular rate and rhythm. No murmurs.  ABDOMEN/GI: Soft, Nontender, Nondistended; Bowel sounds are present  EXTREMITIES:  No edema B/L LE.    LABS:                        13.8   10.29 )-----------( 256      ( 30 Apr 2025 07:19 )             41.5     04-30    141  |  105  |  7[L]  ----------------------------<  77  4.1   |  21  |  0.8    Ca    8.7      30 Apr 2025 07:19    TPro  6.3  /  Alb  3.9  /  TBili  0.9  /  DBili  x   /  AST  245[H]  /  ALT  68[H]  /  AlkPhos  99  04-30    Urinalysis with Rflx Culture (collected 29 Apr 2025 23:48)        RADIOLOGY & ADDITIONAL TESTS:  Imaging or report Personally Reviewed:  [ ] YES  [ ] NO -->no new images            
 Beatricedestiny Otmy was admitted to [facility] on 4/29/2025. He was treated for Rhabdomyolysis. Anticipate to be discharged on 5/4/2025
CHIEF COMPLAINT:    Patient is a 19y old  Male who presents with a chief complaint of arms pain (30 Apr 2025 18:17)      INTERVAL HPI/OVERNIGHT EVENTS:    Patient seen and examined at bedside. No acute overnight events occurred. Soreness on arms and back has improved.     ROS: All other systems are negative.    Medications:  Standing  chlorhexidine 2% Cloths 1 Application(s) Topical <User Schedule>  lactated ringers. 1000 milliLiter(s) IV Continuous <Continuous>    PRN Meds  acetaminophen     Tablet .. 650 milliGRAM(s) Oral every 6 hours PRN  ondansetron Injectable 4 milliGRAM(s) IV Push every 6 hours PRN  senna 2 Tablet(s) Oral at bedtime PRN        Vital Signs:    T(F): 98.1 (05-01-25 @ 14:30), Max: 98.1 (05-01-25 @ 14:30)  HR: 89 (05-01-25 @ 14:30) (68 - 89)  BP: 131/84 (05-01-25 @ 14:30) (129/85 - 131/84)  RR: 18 (05-01-25 @ 14:30) (18 - 18)  SpO2: 97% (05-01-25 @ 14:30) (97% - 99%)  I&O's Summary    Daily     Daily   CAPILLARY BLOOD GLUCOSE    PHYSICAL EXAM:  GENERAL:  NAD  SKIN: No rashes or lesions  HEENT: Atraumatic. Normocephalic. Anicteric  NECK:  No JVD.   PULMONARY: Clear to ausculation bilaterally. No wheezing. No rales  CVS: Normal S1, S2. Regular rate and rhythm. No murmurs.  ABDOMEN/GI: Soft, Nontender, Nondistended; Bowel sounds are present  EXTREMITIES:  No edema B/L LE    LABS:                        13.8   10.29 )-----------( 256      ( 30 Apr 2025 07:19 )             41.5     05-01    140  |  104  |  5[L]  ----------------------------<  114[H]  4.1   |  25  |  0.7    Ca    9.4      01 May 2025 08:44    TPro  6.3  /  Alb  3.9  /  TBili  0.9  /  DBili  x   /  AST  245[H]  /  ALT  68[H]  /  AlkPhos  99  04-30    Urinalysis with Rflx Culture (collected 29 Apr 2025 23:48)      RADIOLOGY & ADDITIONAL TESTS:  Imaging or report Personally Reviewed:  [ ] YES  [ ] NO -->no new images

## 2025-05-03 NOTE — PROGRESS NOTE ADULT - ASSESSMENT
18 yo male with no PMH presented with arm pain after work up in gym. Found to have rhabdomyolysis.     Rhabdomyolysis  -no renal damage  -continue IVF  -trend CPK, slightly down trended
20 yo male with no PMH presented with arm pain after work up in gym. Found to have rhabdomyolysis.     Rhabdomyolysis  -no renal damage  -continue IVF  -trend CPK
18 yo male with no PMH presented with arm pain after work up in gym. Found to have rhabdomyolysis.     Rhabdomyolysis  -no renal damage  -continue IVF  -trend CPK
18 yo male with no PMH presented with arm pain after work up in gym. Found to have rhabdomyolysis.     Rhabdomyolysis  -no renal damage  -continue IVF  -trend CPK    Elevated liver enzyme  -likely due to rhabdo  -will monitor for now

## 2025-05-04 VITALS — DIASTOLIC BLOOD PRESSURE: 85 MMHG | SYSTOLIC BLOOD PRESSURE: 127 MMHG

## 2025-05-04 LAB
ALBUMIN SERPL ELPH-MCNC: 4.4 G/DL — SIGNIFICANT CHANGE UP (ref 3.5–5.2)
ALP SERPL-CCNC: 104 U/L — SIGNIFICANT CHANGE UP (ref 30–115)
ALT FLD-CCNC: 108 U/L — HIGH (ref 13–38)
ANION GAP SERPL CALC-SCNC: 14 MMOL/L — SIGNIFICANT CHANGE UP (ref 7–14)
AST SERPL-CCNC: 151 U/L — HIGH (ref 13–38)
BILIRUB SERPL-MCNC: 0.7 MG/DL — SIGNIFICANT CHANGE UP (ref 0.2–1.2)
BUN SERPL-MCNC: 8 MG/DL — LOW (ref 10–20)
CALCIUM SERPL-MCNC: 10 MG/DL — SIGNIFICANT CHANGE UP (ref 8.4–10.5)
CHLORIDE SERPL-SCNC: 101 MMOL/L — SIGNIFICANT CHANGE UP (ref 98–110)
CK SERPL-CCNC: 6011 U/L — HIGH (ref 0–225)
CO2 SERPL-SCNC: 23 MMOL/L — SIGNIFICANT CHANGE UP (ref 17–32)
CREAT SERPL-MCNC: 0.8 MG/DL — SIGNIFICANT CHANGE UP (ref 0.7–1.5)
EGFR: 130 ML/MIN/1.73M2 — SIGNIFICANT CHANGE UP
EGFR: 130 ML/MIN/1.73M2 — SIGNIFICANT CHANGE UP
GLUCOSE SERPL-MCNC: 90 MG/DL — SIGNIFICANT CHANGE UP (ref 70–99)
POTASSIUM SERPL-MCNC: 4.7 MMOL/L — SIGNIFICANT CHANGE UP (ref 3.5–5)
POTASSIUM SERPL-SCNC: 4.7 MMOL/L — SIGNIFICANT CHANGE UP (ref 3.5–5)
PROT SERPL-MCNC: 7.2 G/DL — SIGNIFICANT CHANGE UP (ref 6–8)
SODIUM SERPL-SCNC: 138 MMOL/L — SIGNIFICANT CHANGE UP (ref 135–146)

## 2025-05-04 PROCEDURE — 99239 HOSP IP/OBS DSCHRG MGMT >30: CPT

## 2025-05-04 NOTE — DISCHARGE NOTE PROVIDER - ATTENDING DISCHARGE PHYSICAL EXAMINATION:
T(C): 36.5 (05-04-25 @ 05:17), Max: 36.8 (05-03-25 @ 20:46)  HR: 69 (05-04-25 @ 05:17) (69 - 105)  BP: 127/85 (05-04-25 @ 09:00) (127/85 - 150/84)  RR: 18 (05-04-25 @ 05:17) (16 - 18)  SpO2: 99% (05-04-25 @ 05:17) (97% - 99%)    CONSTITUTIONAL: Well groomed, no apparent distress  EYES: PERRLA and symmetric, EOMI  RESP: No respiratory distress, no use of accessory muscles; CTA b/l  CV: RRR, +S1S2, no MRG; no JVD; no peripheral edema  GI: Soft, NT, ND, no rebound, no guarding  MSK: Normal gait; No edema on b/l LE  SKIN: No rashes or ulcers noted; no subcutaneous nodules or induration palpable  NEURO: CN II-XII intact; no focal deficit  PSYCH: Appropriate insight/judgment; A+O x 3, mood and affect appropriate

## 2025-05-04 NOTE — DISCHARGE NOTE PROVIDER - CARE PROVIDER_API CALL
Omar Brunner  Pediatrics  [address] [place] [id number]  Phone: [phone]  Fax: [phone]  Established Patient  Follow Up Time: 1-3 days

## 2025-05-04 NOTE — DISCHARGE NOTE NURSING/CASE MANAGEMENT/SOCIAL WORK - FINANCIAL ASSISTANCE
[facility] provides services at a reduced cost to those who are determined to be eligible through [facility]’s financial assistance program. Information regarding [facility]’s financial assistance program can be found by going to [email or web address] or by calling [phone].

## 2025-05-04 NOTE — DISCHARGE NOTE PROVIDER - NSDCDCMDCOMP_GEN_ALL_CORE
Initiate Treatment: Apply clobetasol cream to affected area on the leg for up to 2 weeks, take a week off; occlude with Saran Wrap. FDSE of meds. Detail Level: Generalized This document is complete and the patient is ready for discharge. Render In Strict Bullet Format?: No

## 2025-05-04 NOTE — DISCHARGE NOTE PROVIDER - HOSPITAL COURSE
FROM ADMISSION H+P:   HPI:  18 y/o male presents to the with b/l upper extremity pain worse with movement 2 days after vigorous weight lifting at gym. patient with decreased ability to extend b/l elbows secondary to pain. no relief with ibuprofen. no distal tingling. patient denies any rash, abdominal pain or vomiting. patient with good urinary output however describes as dark. no fevers. (30 Apr 2025 00:53)      ---  HOSPITAL COURSE:   [facility]  #Abnormal LFT  -doppler us of upper extremities performed- No evidence of deep venous thrombosis in either upper extremity.  -IVF administered  -Monitored CPK level- improved on day of dc  -Nephrology consulted- recs appreciated  -Monitored for any sign of compartment syndrome  -GGT- 6  - LFTS monitored    Patient was medically optimized and improved clinically throughout hospital course.      Patient examined on day of discharge and found to be medically stable for discharge by Dr. Thomas           with outpatient follow up.                    Vital Signs Last 24 Hrs  T(C): 36.5 (04 May 2025 05:17), Max: 36.8 (03 May 2025 20:46)  T(F): 97.7 (04 May 2025 05:17), Max: 98.3 (03 May 2025 20:46)  HR: 69 (04 May 2025 05:17) (69 - 105)  BP: 127/85 (04 May 2025 09:00) (127/85 - 150/84)  BP(mean): --  RR: 18 (04 May 2025 05:17) (16 - 18)  SpO2: 99% (04 May 2025 05:17) (97% - 99%)    Parameters below as of 04 May 2025 05:17  Patient On (Oxygen Delivery Method): room air

## 2025-05-04 NOTE — DISCHARGE NOTE PROVIDER - NSDCCPCAREPLAN_GEN_ALL_CORE_FT
PRINCIPAL DISCHARGE DIAGNOSIS  Diagnosis: Rhabdomyolysis  Assessment and Plan of Treatment: You were noted to have Rhabdomyolysis, this is muscle breakdown. It can be caused by a variety of things and usually is associated with muscle pain. Be sure to continue staying well hydrated, drinking at least 6-8 glasses of water a day UNLESS you have been advised to restrict your water/fluid intake for another reason. If you continue to have muscle pain, please see your primary care doctor.      SECONDARY DISCHARGE DIAGNOSES  Diagnosis: Elevated liver function tests  Assessment and Plan of Treatment:

## 2025-05-04 NOTE — DISCHARGE NOTE NURSING/CASE MANAGEMENT/SOCIAL WORK - NSDCPEFALRISK_GEN_ALL_CORE
For information on Fall & Injury Prevention, visit: [email or web address] OR  [email or web address] OR  https://www.cdc.gov/steadi/patient.html

## 2025-05-04 NOTE — DISCHARGE NOTE PROVIDER - NSDCFUADDAPPT_GEN_ALL_CORE_FT
** Please follow up with your primary care doctor in 1-3 days for post-hospitalization medical follow-up and for repeat lab work, including CMP, to confirm improvement of liver function results **

## 2025-05-04 NOTE — DISCHARGE NOTE PROVIDER - NSDCQMAMI_CARD_ALL_CORE
Post Upper Endoscopy     During your procedure today we found      You may experience abdominal bloating or cramps due to air used to inflate the stomach during the procedure.    This is common and can be relieved by walking, belching, and passing gas.    Salt-water gargles and lozenges can help if you have a sore throat.    Your last dose of pain medication was at_______    If any of the following problems occur, call us at  [phone].    Severe pain/ discomfort in abdomen  Difficulty swallowing  Nausea and/or vomiting  Temperature above 101 degrees F  New/ increased bleeding from mouth or rectum, or black, tarry stools  Abdominal bloating not relieved by belching or passing gas  Chest pain or shortness of breath    1. Resume your regular diet, as tolerated  2. Clinic to follow up with pathology results and further recommendations      Due to sedation you received, you may not remember the procedure or the doctor’s explanation afterward.    Our medications sometimes cause dizziness, drowsiness and impaired judgment.    Therefore, please follow these recommendations for the next 24 hours.    Do not drive a car or operate any machinery.  Have a responsible adult drive you home.  Do not make critical decisions or sign any legal documents.  Do not drink alcohol  Do not return to work, or perform any tasks that require coordinated activity    Patient and patient representative have verbalized  understanding of these instructions and have  received a copy.     We would like to take this opportunity to thank you for choosing [facility]. Because your confidence in your caregivers is very important to us, it is our commitment to always treat you and your family with courtesy and respect. Our goal is to always answer any questions or worries or concerns you may have about the care you received If you have any suggestions for improvement or worries or concerns please do not hesitate to let us know.        Home Instruction Sheet  ANESTHESIA for ADULT       What are the side effects?  Side effects depend on the medication used, and may not even be present.    Most Common Sometimes   Irritability  Poor Balance  Sleeping for Several Hours  Drowsiness  Fatigue  Difficulty Concentrating Change in Behavior  Hyperactivity  Nausea (upset stomach)  Gas (flatulence)  Dizziness  Hiccups  Constipation  Blurred Vision     1. The effects of sedation medicine can last up to 24 hours.  You may be drowsy or irritable for 2 to 8 hours after receiving medicine.  2. You may need to sleep after leaving the testing area.  Sleeping after sedation will help reduce irritability.  3. Diet:  - Do not give anything to eat or drink until you are fully awake.  Eat a light meal and advance to a normal diet unless instructed differently:   - Do not drink alcohol beverages for the next 24 hours.  - Avoid greasy or spicy foods today.  4. Activity:  - You may be dizzy and/or unsteady.  Ask for help walking, using the bathroom, or stairs to protect yourself from injury.  - You should not drive a vehicle, operate machinery or power tools for 24 hours because your reflexes may be slow and your vision may be blurry.  - Do not sign any legally binding documents or make important decisions for 24 hours after receiving sedation.  5. Medications:   Unless told otherwise, do not take any non-prescription medications (cold medicine, etc.) for 24 hours, as these medications in combination with sedation medication, can cause increased drowsiness.  If you feel that you need over the counter medication, discuss with your physician.    If you are currently taking or are on Hormonal Contraceptives , these may be ineffective for the next 8 days following anesthesia due to interactions with medications that you may have received during surgery.  Please use additional (back up) contraception during this time.      Examples of hormonal Contraceptive:   -Birth  Control Pills (oral)   -Birth Control Shots (injectable)   -Implantable contraceptives   -Patches   -Vaginal Rings      When Should I Call the Doctor?  - Vomiting more than twice.  - Extreme irritability or unusual changes in behavior.  - Trouble arousing.  - Inability to urinate.  - Trouble breathing - call 911.    We would like to take this opportunity to thank you for choosing [facility]. Because your confidence in your caregivers is very important to us, it is our commitment to always treat you and your family with courtesy and respect. Our goal is to always answer any questions or worries or concerns you may have about the care you received If you have any suggestions for improvement or worries or concerns please do not hesitate to let us know.                                                                                        No

## 2025-05-04 NOTE — DISCHARGE NOTE NURSING/CASE MANAGEMENT/SOCIAL WORK - PATIENT PORTAL LINK FT
You can access the FollowMyHealth Patient Portal offered by [facility] by registering at the following website: [email or web address]. By joining [employer]’s FollowMyHealth portal, you will also be able to view your health information using other applications (apps) compatible with our system.

## 2025-05-16 DIAGNOSIS — R79.89 OTHER SPECIFIED ABNORMAL FINDINGS OF BLOOD CHEMISTRY: ICD-10-CM

## 2025-05-16 DIAGNOSIS — Z88.0 ALLERGY STATUS TO PENICILLIN: ICD-10-CM

## 2025-05-16 DIAGNOSIS — M62.82 RHABDOMYOLYSIS: ICD-10-CM
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
